# Patient Record
Sex: MALE | NOT HISPANIC OR LATINO | Employment: UNEMPLOYED | URBAN - METROPOLITAN AREA
[De-identification: names, ages, dates, MRNs, and addresses within clinical notes are randomized per-mention and may not be internally consistent; named-entity substitution may affect disease eponyms.]

---

## 2018-10-17 ENCOUNTER — APPOINTMENT (OUTPATIENT)
Dept: CT IMAGING | Facility: CLINIC | Age: 18
End: 2018-10-17
Attending: EMERGENCY MEDICINE
Payer: COMMERCIAL

## 2018-10-17 ENCOUNTER — HOSPITAL ENCOUNTER (EMERGENCY)
Facility: CLINIC | Age: 18
Discharge: HOME | End: 2018-10-17
Attending: EMERGENCY MEDICINE | Admitting: EMERGENCY MEDICINE
Payer: COMMERCIAL

## 2018-10-17 VITALS
OXYGEN SATURATION: 99 % | WEIGHT: 196 LBS | SYSTOLIC BLOOD PRESSURE: 125 MMHG | TEMPERATURE: 98.9 F | HEART RATE: 88 BPM | RESPIRATION RATE: 16 BRPM | HEIGHT: 65 IN | BODY MASS INDEX: 32.65 KG/M2 | DIASTOLIC BLOOD PRESSURE: 75 MMHG

## 2018-10-17 DIAGNOSIS — S09.90XA CLOSED HEAD INJURY, INITIAL ENCOUNTER: ICD-10-CM
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
## 2018-10-17 PROCEDURE — 99284 EMERGENCY DEPT VISIT MOD MDM: CPT | Mod: 25

## 2018-10-17 PROCEDURE — 72125 CT NECK SPINE W/O DYE: CPT

## 2018-10-17 PROCEDURE — 70450 CT HEAD/BRAIN W/O DYE: CPT

## 2018-10-17 ASSESSMENT — ENCOUNTER SYMPTOMS
HEADACHES: 1
DIZZINESS: 1
LIGHT-HEADEDNESS: 1

## 2018-10-17 NOTE — ED AVS SNAPSHOT
[facility]    [address]    [facility] [address]    Phone:  [phone]    Fax:  [phone]                                       Andrew Ceballos   MRN: [id number]    Department:  [facility]   Date of Visit:  10/17/2018           After Visit Summary Signature Page     I have received my discharge instructions, and my questions have been answered. I have discussed any challenges I see with this plan with the nurse or doctor.    ..........................................................................................................................................  Patient/Patient Representative Signature      ..........................................................................................................................................  Patient Representative Print Name and Relationship to Patient    ..................................................               ................................................  Date                                   Time    ..........................................................................................................................................  Reviewed by Signature/Title    ...................................................              ..............................................  Date                                               Time          22EPIC Rev 08/18
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
## 2018-10-18 ASSESSMENT — ENCOUNTER SYMPTOMS
SHORTNESS OF BREATH: 0
ABDOMINAL PAIN: 0
MYALGIAS: 0
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
## 2021-09-01 ENCOUNTER — HOSPITAL ENCOUNTER (EMERGENCY)
Facility: CLINIC | Age: 21
Discharge: HOME | End: 2021-09-01
Attending: EMERGENCY MEDICINE | Admitting: EMERGENCY MEDICINE
Payer: COMMERCIAL

## 2021-09-01 VITALS
HEIGHT: 66 IN | HEART RATE: 88 BPM | SYSTOLIC BLOOD PRESSURE: 154 MMHG | WEIGHT: 140 LBS | TEMPERATURE: 98.8 F | RESPIRATION RATE: 16 BRPM | DIASTOLIC BLOOD PRESSURE: 86 MMHG | OXYGEN SATURATION: 99 % | BODY MASS INDEX: 22.5 KG/M2

## 2021-09-01 DIAGNOSIS — F11.10 OPIOID ABUSE (H): ICD-10-CM

## 2021-09-01 PROCEDURE — 250N000009 HC RX 250: Performed by: EMERGENCY MEDICINE

## 2021-09-01 PROCEDURE — 250N000013 HC RX MED GY IP 250 OP 250 PS 637: Performed by: EMERGENCY MEDICINE

## 2021-09-01 PROCEDURE — 99283 EMERGENCY DEPT VISIT LOW MDM: CPT | Performed by: EMERGENCY MEDICINE

## 2021-09-01 RX ADMIN — LIDOCAINE HYDROCHLORIDE 30 ML: 20 SOLUTION ORAL; TOPICAL at 06:37

## 2021-09-01 ASSESSMENT — ENCOUNTER SYMPTOMS
FEVER: 0
BRUISES/BLEEDS EASILY: 0
COUGH: 0
WEAKNESS: 0
DYSURIA: 0
ABDOMINAL PAIN: 0
SHORTNESS OF BREATH: 0
BACK PAIN: 0
CONFUSION: 0

## 2021-09-01 ASSESSMENT — MIFFLIN-ST. JEOR: SCORE: 1577.79

## 2021-09-01 NOTE — DISCHARGE INSTRUCTIONS
Thank you for your patience today.  Please follow-up with your regular doctor in the next 2-3 days for further evaluation and follow-up care.  Please call to schedule an appointment.  Please continue your own medications.  Please avoid using opioids.  Please return to the ER if you develop any worsening of your current symptoms.  It was a pleasure taking care of you today.  We hope you feel better soon.
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
## 2021-09-01 NOTE — ED PROVIDER NOTES
ED Provider Note  [facility]      History     Chief Complaint   Patient presents with     Drug / Alcohol Assessment     HPI  Andrew Ceballos is a 20 year old male who has a past medical history of opiate abuse who presents to the emergency department by EMS for further evaluation and drug assessment.  Patient reports that this evening patient took an oral fentanyl powder from someone he did not know.  Patient denies any other substance abuse.  Patient states he does typically use fentanyl but smokes it or takes it in a pill form.  Denies any heroin, methamphetamine, cocaine, alcohol use.  Patient denies any acute medical complaints, denies any fever, chills, nausea, vomiting, chest pain, shortness of breath, cough or cold-like symptoms, abdominal pain, weakness, headache.  No other complaints.    Past Medical History  History reviewed. No pertinent past medical history.  History reviewed. No pertinent surgical history.  No current outpatient medications on file.    No Known Allergies  Family History  History reviewed. No pertinent family history.  Social History   Social History     Tobacco Use     Smoking status: Never Smoker     Smokeless tobacco: Never Used   Substance Use Topics     Alcohol use: Yes     Comment: SOCIALLY     Drug use: Yes     Types: Marijuana, Fentanyl      Past medical history, past surgical history, medications, allergies, family history, and social history were reviewed with the patient. No additional pertinent items.       Review of Systems   Constitutional: Negative for fever.   HENT: Negative for congestion.    Eyes: Negative for visual disturbance.   Respiratory: Negative for cough and shortness of breath.    Cardiovascular: Negative for chest pain.   Gastrointestinal: Negative for abdominal pain.   Endocrine: Negative for polyuria.   Genitourinary: Negative for dysuria.   Musculoskeletal: Negative for back pain.   Skin: Negative for rash.  "  Allergic/Immunologic: Negative for immunocompromised state.   Neurological: Negative for weakness.   Hematological: Does not bruise/bleed easily.   Psychiatric/Behavioral: Negative for confusion.     Physical Exam   BP: (!) 129/93  Pulse: 78  Temp: 98.8  F (37.1  C)  Resp: 16  Height: 167.6 cm (5' 6\")  Weight: 63.5 kg (140 lb)  SpO2: 99 %  Physical Exam  General: Afebrile, no acute distress   HEENT: Normocephalic, atraumatic, conjunctivae normal. MMM  Neck: non-tender, supple  Cardio: regular rate. regular rhythm   Resp: Normal work of breathing, no respiratory distress, lungs clear bilaterally, no wheezing, rhonchi, rales  Chest/Back: no visual signs of trauma, no CVA tenderness   Abdomen: soft, non distension, no tenderness, no peritoneal signs   Neuro: alert and fully oriented. CN II-XII grossly intact. Grossly normal strength and sensation in all extremities.   MSK: no deformities. Normal range of motion  Integumentary/Skin: no rash visualized, normal color  Psych: normal affect, normal behavior    ED Course      Procedures  No results found for any visits on 09/01/21.  Medications   lidocaine (XYLOCAINE) 2 % 15 mL, alum & mag hydroxide-simethicone (MAALOX) 15 mL GI Cocktail (30 mLs Oral Given 9/1/21 0637)        Assessments & Plan (with Medical Decision Making)   Andrew Ceballos is a 20 year old male who has a past medical history of opiate abuse who presents to the emergency department by EMS for further evaluation and drug assessment after orally ingesting fentanyl powder.  Upon arrival patient is well-appearing, afebrile, no distress.  Patient hemodynamically stable vital signs within normal limits with blood pressure 129/93, heart rate 70, oxygen 99% on room air.  Patient denies any chest pain, shortness of breath, no acute medical complaints.  At this time will continue close monitoring of patient cardiac and respiratory status in the emergency department, likely discharge home in the morning once " clinically sober.  Patient understands and agrees with the plan.    On reevaluation patient continues to be hemodynamically stable, resting comfortably, eating and drinking without difficulty.  At this time plan for discharge home, encourage close follow-up, avoid opioid use, return precautions discussed.  Patient understands and agrees the plan.    I have reviewed the nursing notes. I have reviewed the findings, diagnosis, plan and need for follow up with the patient.    New Prescriptions    No medications on file       Final diagnoses:   Opioid abuse (H)       --  Judit Cabrera MD  [facility] EMERGENCY DEPARTMENT  9/1/2021     Judit Cabrera MD  09/01/21 0657

## 2023-09-04 ENCOUNTER — HOSPITAL ENCOUNTER (EMERGENCY)
Facility: CLINIC | Age: 23
Discharge: HOME | End: 2023-09-04
Attending: EMERGENCY MEDICINE | Admitting: EMERGENCY MEDICINE
Payer: COMMERCIAL

## 2023-09-04 VITALS
OXYGEN SATURATION: 96 % | DIASTOLIC BLOOD PRESSURE: 102 MMHG | TEMPERATURE: 97.8 F | HEART RATE: 113 BPM | WEIGHT: 199.4 LBS | SYSTOLIC BLOOD PRESSURE: 157 MMHG | RESPIRATION RATE: 18 BRPM | BODY MASS INDEX: 32.18 KG/M2

## 2023-09-04 DIAGNOSIS — S01.511A LIP LACERATION, INITIAL ENCOUNTER: ICD-10-CM

## 2023-09-04 PROCEDURE — 99282 EMERGENCY DEPT VISIT SF MDM: CPT

## 2023-09-04 PROCEDURE — 12011 RPR F/E/E/N/L/M 2.5 CM/<: CPT | Performed by: EMERGENCY MEDICINE

## 2023-09-04 PROCEDURE — 12011 RPR F/E/E/N/L/M 2.5 CM/<: CPT

## 2023-09-04 PROCEDURE — 99283 EMERGENCY DEPT VISIT LOW MDM: CPT | Mod: 25 | Performed by: EMERGENCY MEDICINE
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
## 2023-09-04 NOTE — ED PROVIDER NOTES
History     Chief Complaint   Patient presents with    Lip Laceration     HPI  Andrew Ceballos is a 22 year old male presents from prison after falling and hitting upper lip on metal toilet.  He denies losing consciousness, denies any dental pain or dental trauma.  He denies seizure activity before or after.    Allergies:  No Known Allergies    Problem List:    There are no problems to display for this patient.       Past Medical History:    No past medical history on file.    Past Surgical History:    No past surgical history on file.    Family History:    No family history on file.    Social History:  Marital Status:  Single [1]  Social History     Tobacco Use    Smoking status: Never    Smokeless tobacco: Never   Substance Use Topics    Alcohol use: Yes     Comment: SOCIALLY    Drug use: Yes     Types: Marijuana, Fentanyl        Medications:    No current outpatient medications on file.        Review of Systems   All other systems reviewed and are negative.      Physical Exam   BP: (!) 157/102  Pulse: 113  Temp: 97.8  F (36.6  C)  Resp: 18  Weight: 90.4 kg (199 lb 6.4 oz)  SpO2: 96 %      Physical Exam  HENT:      Mouth/Throat:      Mouth: Lacerations present.        Comments: Right  outside lip 1 cm laceration, through and through    Left inside lip 0.3cm laceration    No dental trauma or tenderness          ED Course         22 year old male presents from prison after falling and hitting upper lip on metal toilet.  He denies losing consciousness, denies any dental pain or dental trauma.  He denies seizure activity before or after.    We will repair the outside right lip laceration with 5-0 fast gut, and allow the inside of mouth laceration to heal by secondary intention.         Former[facility]    -Laceration Repair    Date/Time: 9/4/2023 2:49 PM    Performed by: Jake Landaverde MD  Authorized by: Jake Landaverde MD    Risks, benefits and alternatives discussed.      ANESTHESIA (see MAR for  exact dosages):     Anesthesia method:  Local infiltration    Local anesthetic:  Lidocaine 1% w/o epi  LACERATION DETAILS     Location:  Lip    Lip location:  Upper exterior lip    Length (cm):  1    REPAIR TYPE:     Repair type:  Simple      SKIN REPAIR     Repair method:  Sutures    Suture size:  5-0    Suture material:  Fast-absorbing gut    Suture technique:  Simple interrupted    Number of sutures:  5    APPROXIMATION     Approximation:  Close    Vermilion border well-aligned: Does not involve vermilion border.             No results found for this or any previous visit (from the past 24 hour(s)).    Medications - No data to display    Assessments & Plan (with Medical Decision Making)     I have reviewed the nursing notes.    I have reviewed the findings, diagnosis, plan and need for follow up with the patient.      New Prescriptions    No medications on file       Final diagnoses:   Lip laceration, initial encounter       9/4/2023   [facility] EMERGENCY DEPT       Jake Landaverde MD  09/04/23 [id number]